# Patient Record
Sex: MALE | Race: WHITE | NOT HISPANIC OR LATINO | Employment: FULL TIME | URBAN - METROPOLITAN AREA
[De-identification: names, ages, dates, MRNs, and addresses within clinical notes are randomized per-mention and may not be internally consistent; named-entity substitution may affect disease eponyms.]

---

## 2023-11-13 ENCOUNTER — LAB (OUTPATIENT)
Dept: LAB | Facility: LAB | Age: 33
End: 2023-11-13
Payer: COMMERCIAL

## 2023-11-13 DIAGNOSIS — R53.83 OTHER FATIGUE: Primary | ICD-10-CM

## 2023-11-13 LAB
25(OH)D3 SERPL-MCNC: 19 NG/ML (ref 30–100)
ALBUMIN SERPL BCP-MCNC: 4.6 G/DL (ref 3.4–5)
ALP SERPL-CCNC: 76 U/L (ref 33–120)
ALT SERPL W P-5'-P-CCNC: 47 U/L (ref 10–52)
ANION GAP SERPL CALC-SCNC: 11 MMOL/L (ref 10–20)
AST SERPL W P-5'-P-CCNC: 30 U/L (ref 9–39)
BASOPHILS # BLD AUTO: 0.05 X10*3/UL (ref 0–0.1)
BASOPHILS NFR BLD AUTO: 0.8 %
BILIRUB SERPL-MCNC: 0.4 MG/DL (ref 0–1.2)
BUN SERPL-MCNC: 13 MG/DL (ref 6–23)
CALCIUM SERPL-MCNC: 9.6 MG/DL (ref 8.6–10.3)
CHLORIDE SERPL-SCNC: 100 MMOL/L (ref 98–107)
CHOLEST SERPL-MCNC: 191 MG/DL (ref 0–199)
CHOLESTEROL/HDL RATIO: 3.1
CO2 SERPL-SCNC: 32 MMOL/L (ref 21–32)
CREAT SERPL-MCNC: 0.84 MG/DL (ref 0.5–1.3)
EOSINOPHIL # BLD AUTO: 0.11 X10*3/UL (ref 0–0.7)
EOSINOPHIL NFR BLD AUTO: 1.7 %
ERYTHROCYTE [DISTWIDTH] IN BLOOD BY AUTOMATED COUNT: 11.9 % (ref 11.5–14.5)
FERRITIN SERPL-MCNC: 107 NG/ML (ref 20–300)
GFR SERPL CREATININE-BSD FRML MDRD: >90 ML/MIN/1.73M*2
GLUCOSE SERPL-MCNC: 98 MG/DL (ref 74–99)
HCT VFR BLD AUTO: 46.7 % (ref 41–52)
HDLC SERPL-MCNC: 62 MG/DL
HGB BLD-MCNC: 16 G/DL (ref 13.5–17.5)
IMM GRANULOCYTES # BLD AUTO: 0.02 X10*3/UL (ref 0–0.7)
IMM GRANULOCYTES NFR BLD AUTO: 0.3 % (ref 0–0.9)
IRON SATN MFR SERPL: 31 % (ref 25–45)
IRON SERPL-MCNC: 101 UG/DL (ref 35–150)
LDLC SERPL CALC-MCNC: 65 MG/DL
LYMPHOCYTES # BLD AUTO: 2.1 X10*3/UL (ref 1.2–4.8)
LYMPHOCYTES NFR BLD AUTO: 32.6 %
MCH RBC QN AUTO: 28.8 PG (ref 26–34)
MCHC RBC AUTO-ENTMCNC: 34.3 G/DL (ref 32–36)
MCV RBC AUTO: 84 FL (ref 80–100)
MONOCYTES # BLD AUTO: 0.53 X10*3/UL (ref 0.1–1)
MONOCYTES NFR BLD AUTO: 8.2 %
NEUTROPHILS # BLD AUTO: 3.64 X10*3/UL (ref 1.2–7.7)
NEUTROPHILS NFR BLD AUTO: 56.4 %
NON HDL CHOLESTEROL: 129 MG/DL (ref 0–149)
NRBC BLD-RTO: 0 /100 WBCS (ref 0–0)
PLATELET # BLD AUTO: 180 X10*3/UL (ref 150–450)
POTASSIUM SERPL-SCNC: 3.7 MMOL/L (ref 3.5–5.3)
PROT SERPL-MCNC: 6.6 G/DL (ref 6.4–8.2)
RBC # BLD AUTO: 5.56 X10*6/UL (ref 4.5–5.9)
SODIUM SERPL-SCNC: 139 MMOL/L (ref 136–145)
T3FREE SERPL-MCNC: 4 PG/ML (ref 2.3–4.2)
T4 FREE SERPL-MCNC: 0.97 NG/DL (ref 0.61–1.12)
T4 SERPL-MCNC: 7.9 UG/DL (ref 4.5–11.1)
THYROPEROXIDASE AB SERPL-ACNC: <28 IU/ML
TIBC SERPL-MCNC: 326 UG/DL (ref 240–445)
TRIGL SERPL-MCNC: 321 MG/DL (ref 0–149)
TSH SERPL-ACNC: 2.92 MIU/L (ref 0.44–3.98)
UIBC SERPL-MCNC: 225 UG/DL (ref 110–370)
VLDL: 64 MG/DL (ref 0–40)
WBC # BLD AUTO: 6.5 X10*3/UL (ref 4.4–11.3)

## 2023-11-13 PROCEDURE — 84439 ASSAY OF FREE THYROXINE: CPT

## 2023-11-13 PROCEDURE — 36415 COLL VENOUS BLD VENIPUNCTURE: CPT

## 2023-11-13 PROCEDURE — 84481 FREE ASSAY (FT-3): CPT

## 2023-11-13 PROCEDURE — 85025 COMPLETE CBC W/AUTO DIFF WBC: CPT

## 2023-11-13 PROCEDURE — 83550 IRON BINDING TEST: CPT

## 2023-11-13 PROCEDURE — 80061 LIPID PANEL: CPT

## 2023-11-13 PROCEDURE — 80053 COMPREHEN METABOLIC PANEL: CPT

## 2023-11-13 PROCEDURE — 82306 VITAMIN D 25 HYDROXY: CPT

## 2023-11-13 PROCEDURE — 84436 ASSAY OF TOTAL THYROXINE: CPT

## 2023-11-13 PROCEDURE — 82728 ASSAY OF FERRITIN: CPT

## 2023-11-13 PROCEDURE — 86376 MICROSOMAL ANTIBODY EACH: CPT

## 2023-11-13 PROCEDURE — 83540 ASSAY OF IRON: CPT

## 2023-11-13 PROCEDURE — 84443 ASSAY THYROID STIM HORMONE: CPT

## 2024-04-03 ENCOUNTER — OFFICE VISIT (OUTPATIENT)
Dept: URGENT CARE | Facility: CLINIC | Age: 34
End: 2024-04-03
Payer: COMMERCIAL

## 2024-04-03 VITALS
OXYGEN SATURATION: 96 % | RESPIRATION RATE: 16 BRPM | BODY MASS INDEX: 21.48 KG/M2 | SYSTOLIC BLOOD PRESSURE: 129 MMHG | HEART RATE: 62 BPM | HEIGHT: 69 IN | TEMPERATURE: 98 F | WEIGHT: 145 LBS | DIASTOLIC BLOOD PRESSURE: 77 MMHG

## 2024-04-03 DIAGNOSIS — H10.31 ACUTE BACTERIAL CONJUNCTIVITIS OF RIGHT EYE: Primary | ICD-10-CM

## 2024-04-03 PROCEDURE — 99212 OFFICE O/P EST SF 10 MIN: CPT | Performed by: PHYSICIAN ASSISTANT

## 2024-04-03 RX ORDER — TOBRAMYCIN 3 MG/ML
2 SOLUTION/ DROPS OPHTHALMIC EVERY 4 HOURS
Qty: 5 ML | Refills: 0 | Status: SHIPPED | OUTPATIENT
Start: 2024-04-03 | End: 2024-04-10

## 2024-04-03 ASSESSMENT — VISUAL ACUITY: OU: 1

## 2024-04-03 NOTE — PROGRESS NOTES
[facility]   ERLINDA NOTE:      Name: Matheus Banks, 34 y.o.    CSN:[id number]   MRN:[id number]    PCP: Michele John MD    ALL:  No Known Allergies    History:    Chief Complaint: Eye Problem (Right eye irritation and drainage since this AM. Exposure to pink eye )    Encounter Date: 4/3/2024  11:20 AM    HPI: The history was obtained from the patient. Matheus is a 34 y.o. male, who presents with a chief complaint of Eye Problem (Right eye irritation and drainage since this AM. Exposure to pink eye ) Patient has been having right sided eye irritation, increased watery discharge, and redness starting this morning. He has been a little sick for the past week but has been getting better. He has four kids, who all have had pink eye and treated by pediatrician.     PMHx:    History reviewed. No pertinent past medical history.           Current Outpatient Medications   Medication Sig Dispense Refill    tobramycin (Tobrex) 0.3 % ophthalmic solution Administer 2 drops into affected eye(s) every 4 hours for 7 days. 5 mL 0     No current facility-administered medications for this visit.         PMSx:  History reviewed. No pertinent surgical history.    Fam Hx: No family history on file.    SOC. Hx:     Social History     Socioeconomic History    Marital status:      Spouse name: Not on file    Number of children: Not on file    Years of education: Not on file    Highest education level: Not on file   Occupational History    Not on file   Tobacco Use    Smoking status: Never    Smokeless tobacco: Never   Substance and Sexual Activity    Alcohol use: Not on file    Drug use: Not on file    Sexual activity: Not on file   Other Topics Concern    Not on file   Social History Narrative    Not on file     Social Determinants of Health     Financial Resource Strain: Not on file   Food Insecurity: Not on file   Transportation Needs: Not on file   Physical Activity: Not on file   Stress: Not on file    Social Connections: Not on file   Intimate Partner Violence: Not on file   Housing Stability: Not on file         Vitals:    04/03/24 1132   BP: 129/77   Pulse: 62   Resp: 16   Temp: 36.7 °C (98 °F)   SpO2: 96%     65.8 kg (145 lb)          Physical Exam  Vitals reviewed.   Constitutional:       Appearance: Normal appearance. He is normal weight.   HENT:      Head: Normocephalic and atraumatic.      Right Ear: Tympanic membrane and ear canal normal.      Left Ear: Tympanic membrane and ear canal normal.      Nose: Nose normal.      Mouth/Throat:      Mouth: Mucous membranes are moist.      Pharynx: Oropharynx is clear. No oropharyngeal exudate or posterior oropharyngeal erythema.   Eyes:      General: Lids are normal. Vision grossly intact.         Right eye: Discharge present.         Left eye: No discharge.      Extraocular Movements: Extraocular movements intact.      Right eye: Normal extraocular motion.      Left eye: Normal extraocular motion.      Conjunctiva/sclera:      Right eye: Right conjunctiva is injected.      Left eye: Left conjunctiva is not injected.      Comments: Tactile pressure normal  Visual acuity 20/20   Musculoskeletal:         General: Normal range of motion.      Cervical back: Normal range of motion and neck supple.   Lymphadenopathy:      Head:      Right side of head: No preauricular adenopathy.      Left side of head: No preauricular adenopathy.   Skin:     General: Skin is warm.   Neurological:      Mental Status: He is alert and oriented to person, place, and time.   Psychiatric:         Behavior: Behavior normal.         ____________________________________________________________________    I did personally review Matheus's past medical history, surgical history, social history, as well as family history (when relevant).  In this case, I also oversaw the his drug management by reviewing his medication list, allergy list, as well as the medications that I prescribed during the UC  course and/or recommended as an out-patient (including possible OTC medications such as acetaminophen, NSAIDs , etc).    After reviewing the items above, I did look at previous medical documentation, such as recent hospitalizations, office visits, and/or recent consultations with PCP/specialist.                          SDOH:   Another factor that I considered in Matheus's care was his Social Determinants of Health (SDOH). During this UC encounter, he did not have social determinants of health. Those SDOH influencing Matheus's care are: none      _____________________________________________________________________      UC COURSE/MEDICAL DECISION MAKING:    Matheus is a 34 y.o., who presents with a working diagnosis of   1. Acute bacterial conjunctivitis of right eye    DDX: HSV ophthalmicus, viral vs. Allergic conjunctivitis, FB potential, abrasion, blepharitis, hordeolum, chalazion, pinguicula, pterygoid      Start tobramycin eye drops  Encouraged to reduce spread by washing hands, washing clothes and pillow cases        Duc Begum PA-C   Advanced Practice Provider  [facility]

## 2024-12-05 ENCOUNTER — LAB (OUTPATIENT)
Dept: LAB | Facility: LAB | Age: 34
End: 2024-12-05

## 2024-12-05 DIAGNOSIS — Z13.21 ENCOUNTER FOR SCREENING FOR NUTRITIONAL DISORDER: Primary | ICD-10-CM

## 2024-12-05 LAB — 25(OH)D3 SERPL-MCNC: 41 NG/ML (ref 30–100)

## 2024-12-05 PROCEDURE — 82306 VITAMIN D 25 HYDROXY: CPT

## 2024-12-05 PROCEDURE — 36415 COLL VENOUS BLD VENIPUNCTURE: CPT

## 2025-08-22 ENCOUNTER — HOSPITAL ENCOUNTER (OUTPATIENT)
Dept: RADIOLOGY | Facility: HOSPITAL | Age: 35
Discharge: HOME | End: 2025-08-22
Payer: COMMERCIAL

## 2025-08-22 DIAGNOSIS — N50.89 OTHER SPECIFIED DISORDERS OF THE MALE GENITAL ORGANS: ICD-10-CM

## 2025-08-22 PROCEDURE — 76870 US EXAM SCROTUM: CPT
